# Patient Record
Sex: FEMALE | Race: AMERICAN INDIAN OR ALASKA NATIVE
[De-identification: names, ages, dates, MRNs, and addresses within clinical notes are randomized per-mention and may not be internally consistent; named-entity substitution may affect disease eponyms.]

---

## 2017-10-10 NOTE — MAMMOGRAPHY REPORT
BONE DENSITY STUDY:



DEFINITIONS:

  BMD     = Bone Mineral Density

  T-score = BMD related to mean peak bone mass of young adult

            (mean expressed in Standard Deviation)

  Z-score = Age matched BMD expressed in SD



World Health Organization (WHO) Diagnostic Criteria

  Normal             T-score > -1 SD

  Osteopenia      T-score between -1 and -2.4 SD

  Osteoporosis    T-score -2.5 SD or below



FINDINGS:

The weighted average BMD of lumbar spine L1-L4 is 1.027 with a T-score 

of -2.2.



The weighted average BMD of hip is 0.986 with a T-score of 0.4.



IMPRESSION:

The patient's T-score is diagnostic for normal bone density and

low relative risk for fracture.



NOTE:

BMD is not the only risk factor for fracture; also consider

factors such as the patient's age, risk of falling, previous

osteoporotic fracture, family history of osteoporotic fractures, 

current smoker, and low body weight.



Jeffries's triangle is a region of interest in femur, predominantly

of trabecular bone.  It is not a true anatomic site, and [facility] does not 

recommend its use clinically.

## 2019-11-30 ENCOUNTER — HOSPITAL ENCOUNTER (INPATIENT)
Dept: HOSPITAL 5 - ED | Age: 59
LOS: 2 days | Discharge: HOME | DRG: 206 | End: 2019-12-02
Attending: INTERNAL MEDICINE | Admitting: INTERNAL MEDICINE
Payer: MEDICAID

## 2019-11-30 DIAGNOSIS — M94.0: Primary | ICD-10-CM

## 2019-11-30 DIAGNOSIS — F17.210: ICD-10-CM

## 2019-11-30 DIAGNOSIS — M10.9: ICD-10-CM

## 2019-11-30 DIAGNOSIS — Z95.5: ICD-10-CM

## 2019-11-30 DIAGNOSIS — Z71.6: ICD-10-CM

## 2019-11-30 DIAGNOSIS — Z86.73: ICD-10-CM

## 2019-11-30 DIAGNOSIS — I48.91: ICD-10-CM

## 2019-11-30 DIAGNOSIS — Z90.710: ICD-10-CM

## 2019-11-30 DIAGNOSIS — Z79.899: ICD-10-CM

## 2019-11-30 DIAGNOSIS — I47.1: ICD-10-CM

## 2019-11-30 DIAGNOSIS — I25.10: ICD-10-CM

## 2019-11-30 DIAGNOSIS — I10: ICD-10-CM

## 2019-11-30 DIAGNOSIS — I48.92: ICD-10-CM

## 2019-11-30 DIAGNOSIS — Z72.89: ICD-10-CM

## 2019-11-30 DIAGNOSIS — Z82.49: ICD-10-CM

## 2019-11-30 DIAGNOSIS — F32.9: ICD-10-CM

## 2019-11-30 LAB
APTT BLD: 26.4 SEC. (ref 24.2–36.6)
BASOPHILS # (AUTO): 0 K/MM3 (ref 0–0.1)
BASOPHILS NFR BLD AUTO: 0.7 % (ref 0–1.8)
BUN SERPL-MCNC: 15 MG/DL (ref 7–17)
BUN/CREAT SERPL: 15 %
CALCIUM SERPL-MCNC: 8.9 MG/DL (ref 8.4–10.2)
EOSINOPHIL # BLD AUTO: 0 K/MM3 (ref 0–0.4)
EOSINOPHIL NFR BLD AUTO: 0.8 % (ref 0–4.3)
HCT VFR BLD CALC: 34 % (ref 30.3–42.9)
HEMOLYSIS INDEX: 17
HGB BLD-MCNC: 11.4 GM/DL (ref 10.1–14.3)
INR PPP: 1 (ref 0.87–1.13)
LYMPHOCYTES # BLD AUTO: 1.4 K/MM3 (ref 1.2–5.4)
LYMPHOCYTES NFR BLD AUTO: 25.9 % (ref 13.4–35)
MCHC RBC AUTO-ENTMCNC: 34 % (ref 30–34)
MCV RBC AUTO: 110 FL (ref 79–97)
MONOCYTES # (AUTO): 0.4 K/MM3 (ref 0–0.8)
MONOCYTES % (AUTO): 7.2 % (ref 0–7.3)
PLATELET # BLD: 206 K/MM3 (ref 140–440)
RBC # BLD AUTO: 3.09 M/MM3 (ref 3.65–5.03)

## 2019-11-30 PROCEDURE — 85610 PROTHROMBIN TIME: CPT

## 2019-11-30 PROCEDURE — G0378 HOSPITAL OBSERVATION PER HR: HCPCS

## 2019-11-30 PROCEDURE — 85025 COMPLETE CBC W/AUTO DIFF WBC: CPT

## 2019-11-30 PROCEDURE — 99406 BEHAV CHNG SMOKING 3-10 MIN: CPT

## 2019-11-30 PROCEDURE — A9502 TC99M TETROFOSMIN: HCPCS

## 2019-11-30 PROCEDURE — 71045 X-RAY EXAM CHEST 1 VIEW: CPT

## 2019-11-30 PROCEDURE — 84484 ASSAY OF TROPONIN QUANT: CPT

## 2019-11-30 PROCEDURE — 36415 COLL VENOUS BLD VENIPUNCTURE: CPT

## 2019-11-30 PROCEDURE — 80061 LIPID PANEL: CPT

## 2019-11-30 PROCEDURE — 93010 ELECTROCARDIOGRAM REPORT: CPT

## 2019-11-30 PROCEDURE — 80048 BASIC METABOLIC PNL TOTAL CA: CPT

## 2019-11-30 PROCEDURE — 96374 THER/PROPH/DIAG INJ IV PUSH: CPT

## 2019-11-30 PROCEDURE — 85730 THROMBOPLASTIN TIME PARTIAL: CPT

## 2019-11-30 PROCEDURE — 93005 ELECTROCARDIOGRAM TRACING: CPT

## 2019-11-30 PROCEDURE — 87116 MYCOBACTERIA CULTURE: CPT

## 2019-11-30 PROCEDURE — 93017 CV STRESS TEST TRACING ONLY: CPT

## 2019-11-30 PROCEDURE — 78452 HT MUSCLE IMAGE SPECT MULT: CPT

## 2019-11-30 NOTE — EMERGENCY DEPARTMENT REPORT
ED Chest Pain HPI





- General


Chief Complaint: Arrhythmia/Palpitations


Stated Complaint: CHEST PAIN


Time Seen by Provider: 11/30/19 19:56


Source: patient, EMS


Mode of arrival: Stretcher


Limitations: No Limitations





- History of Present Illness


Initial Comments: 





58-year-old female with history of A. fib, CVA, presents to ED with chest pain, 

onset this evening.  EMS was called, by EMS arrival patient found to be in SVT. 

Adenosine 6 mg given, however, heart rate did improve the patient transported to

the ED.  Patient reports "heaviness" in her chest.  She denies any shortness of 

breath.  Vagal maneuvers were attempted but did not work.  Patient denies 

palpitations.  Pt reports recent cardioversion.


MD Complaint: chest pain


-: This evening


Onset: during rest


Pain Location: substernal


Pain Radiation: none


Severity: moderate


Quality: heaviness


Consistency: constant


Improves With: nothing


Worsens With: nothing


re: denies: nausea, vomting, dyspnea


Other Symptoms: denies: palpitations


Treatments Prior to Arrival: other (adenosine)





- Related Data


                                Home Medications











 Medication  Instructions  Recorded  Confirmed  Last Taken


 


Amlodipine Besylate/Benazepril 1 each PO QDAY 09/11/14 11/30/19 10/31/14





[Lotrel 5-10 mg]    


 


buPROPion [Wellbutrin] 75 mg PO BID 09/11/14 11/30/19 10/31/14


 


cloNIDine [Catapres] 0.2 mg PO BID 09/11/14 11/30/19 10/31/14


 


Apixaban [Eliquis] 5 mg PO BID 11/30/19 11/30/19 Unknown


 


ISOSORBIDE MONOnitrate [Imdur ER] 30 mg PO DAILY 11/30/19 11/30/19 Unknown


 


carvediloL [Coreg] 25 mg PO BID 11/30/19 11/30/19 Unknown











                                    Allergies











Allergy/AdvReac Type Severity Reaction Status Date / Time


 


No Known Allergies Allergy   Verified 01/13/15 15:11














Heart Score





- HEART Score


History: Moderately suspicious


EKG: Non-specific


Age: 45-65


Risk factors: > 3 risk factors or hx of atherosclerotic disease


Troponin: < normal limit


HEART Score: 5





ED Review of Systems


ROS: 


Stated complaint: CHEST PAIN


Other details as noted in HPI





Comment: All other systems reviewed and negative


Constitutional: denies: chills, fever


Respiratory: denies: shortness of breath


Cardiovascular: chest pain.  denies: palpitations


Gastrointestinal: denies: nausea, vomiting





ED Past Medical Hx





- Past Medical History


Hx Hypertension: Yes


Hx CVA: Yes


Hx Congestive Heart Failure: No


Hx Diabetes: No


Hx Renal Disease: Yes


Hx Seizures: Yes


Hx Asthma: No


Hx COPD: No


Hx HIV: No


Additional medical history: heart murmur, GOUT, a-fib (w/ RVR), SVT, 

cardioversion





- Surgical History


Additional Surgical History: hysterectomy, cone biopsy from cervix





- Social History


Smoking Status: Current Every Day Smoker


Substance Use Type: Alcohol





- Medications


Home Medications: 


                                Home Medications











 Medication  Instructions  Recorded  Confirmed  Last Taken  Type


 


Amlodipine Besylate/Benazepril 1 each PO QDAY 09/11/14 11/30/19 10/31/14 History





[Lotrel 5-10 mg]     


 


buPROPion [Wellbutrin] 75 mg PO BID 09/11/14 11/30/19 10/31/14 History


 


cloNIDine [Catapres] 0.2 mg PO BID 09/11/14 11/30/19 10/31/14 History


 


Apixaban [Eliquis] 5 mg PO BID 11/30/19 11/30/19 Unknown History


 


ISOSORBIDE MONOnitrate [Imdur ER] 30 mg PO DAILY 11/30/19 11/30/19 Unknown 

History


 


carvediloL [Coreg] 25 mg PO BID 11/30/19 11/30/19 Unknown History














ED Physical Exam





- General


Limitations: No Limitations


General appearance: alert, in no apparent distress





- Head


Head exam: Present: atraumatic, normocephalic





- Eye


Eye exam: Present: normal appearance





- ENT


ENT exam: Present: mucous membranes moist





- Neck


Neck exam: Present: normal inspection





- Respiratory


Respiratory exam: Present: normal lung sounds bilaterally.  Absent: respiratory 

distress





- Cardiovascular


Cardiovascular Exam: Present: normal rhythm, tachycardia





- GI/Abdominal


GI/Abdominal exam: Present: soft.  Absent: distended, tenderness





- Extremities Exam


Extremities exam: Present: normal inspection





- Neurological Exam


Neurological exam: Present: alert, oriented X3, motor sensory deficit (baseline 

left-sided weakness secondary to previous CVA)





- Psychiatric


Psychiatric exam: Present: normal affect, normal mood





- Skin


Skin exam: Present: warm, dry, intact, normal color.  Absent: diaphoretic





ED Course


                                   Vital Signs











  11/30/19 11/30/19 11/30/19





  19:53 19:55 19:56


 


Temperature  98.2 F 


 


Pulse Rate   174 H


 


Respiratory 14  13





Rate   


 


Blood Pressure   


 


O2 Sat by Pulse   97





Oximetry   














  11/30/19 11/30/19 11/30/19





  20:00 20:06 20:10


 


Temperature   


 


Pulse Rate 174 H 173 H 174 H


 


Respiratory 17 14 21





Rate   


 


Blood Pressure   


 


O2 Sat by Pulse 100 100 99





Oximetry   














  11/30/19 11/30/19 11/30/19





  20:16 20:20 20:25


 


Temperature   


 


Pulse Rate 160 H 175 H 173 H


 


Respiratory 19 21 18





Rate   


 


Blood Pressure 81/58 81/58 105/77


 


O2 Sat by Pulse 98 98 





Oximetry   














  11/30/19 11/30/19 11/30/19





  20:30 20:35 20:40


 


Temperature   


 


Pulse Rate 85 87 88


 


Respiratory 18 17 16





Rate   


 


Blood Pressure 133/80 129/80 133/83


 


O2 Sat by Pulse 93 100 100





Oximetry   














  11/30/19 11/30/19 11/30/19





  20:45 20:50 20:56


 


Temperature   


 


Pulse Rate 85 87 87


 


Respiratory 15 17 16





Rate   


 


Blood Pressure 131/82 126/87 50/24


 


O2 Sat by Pulse 95 99 99





Oximetry   














  11/30/19 11/30/19 11/30/19





  21:00 21:05 21:10


 


Temperature   


 


Pulse Rate 86 88 86


 


Respiratory 11 L 15 17





Rate   


 


Blood Pressure 50/24 138/88 137/91


 


O2 Sat by Pulse 98 99 99





Oximetry   














- Consultations


Consultation #1: 





11/30/19 21:40


Spoke w/ Dr Scales. Pt to be seen in AM.





ED Medical Decision Making





- Lab Data


Result diagrams: 


                                 11/30/19 20:49





                                 11/30/19 20:49





- EKG Data


-: EKG Interpreted by Me


EKG shows normal: QRS complexes


Rate: tachycardia (rate 168)





- EKG Data


Interpretation: other (SVT)





- Radiology Data


Radiology results: report reviewed, image reviewed





- Medical Decision Making





57 yo F w/ chest pain and atrial flutter with RVR.  Initially suspected to be 

SVT, pt was given adenosine 6 mg in the field with no response, and 12 mg here 

in the ED w/ short period of slowing of heart rate. Rate then returned to the 

170s.  Diltiazem was given and rate responded by decreasing to the 80s.  BP also

 improved.  HR increased again to the 120s and cardizem drip was initiated.  

Labs are unremarkable.  Will admit to hospitalist,  Dr Stuart.





- Differential Diagnosis


SVT, Afib/ Aflutter, ACS


Critical Care Time: Yes


Critical care time in (mins) excluding proc time.: 35


Critical care attestation.: 


If time is entered above; I have spent that time in minutes in the direct care 

of this critically ill patient, excluding procedure time.





Critical Care Time: 





35 minutes





ED Disposition


Clinical Impression: 


 Atrial flutter with rapid ventricular response, Acute chest pain





Disposition: DC-09 OP ADMIT IP TO THIS HOSP


Is pt being admited?: Yes


Condition: Stable


Instructions:  Chest Pain (ED)


Referrals: 


PRIMARY CARE,MD [Referring] - 3-5 Days


Time of Disposition: 21:41

## 2019-11-30 NOTE — XRAY REPORT
CHEST 1 VIEW 



INDICATION / CLINICAL INFORMATION:

chest pain.



COMPARISON: 

11/1/2014



FINDINGS:



SUPPORT DEVICES: None.

HEART / MEDIASTINUM: There is prominence the cardiac silhouette. There is mild hilar prominence which
 appears similar to the previous study from 2014. This is likely vascular prominence. 

LUNGS / PLEURA: No significant pulmonary or pleural abnormality..  No pneumothorax. 



ADDITIONAL FINDINGS: No significant additional findings.



IMPRESSION:

1. No significant change



Signer Name: Heri Armstrong MD 

Signed: 11/30/2019 9:26 PM

 Workstation Name: [id number]

## 2019-12-01 LAB — HDLC SERPL-MCNC: 51 MG/DL (ref 40–59)

## 2019-12-01 RX ADMIN — NICOTINE SCH MG: 14 PATCH TRANSDERMAL at 09:31

## 2019-12-01 RX ADMIN — APIXABAN SCH MG: 5 TABLET, FILM COATED ORAL at 09:35

## 2019-12-01 RX ADMIN — APIXABAN SCH MG: 5 TABLET, FILM COATED ORAL at 22:03

## 2019-12-01 RX ADMIN — LISINOPRIL SCH MG: 10 TABLET ORAL at 09:35

## 2019-12-01 NOTE — HISTORY AND PHYSICAL REPORT
History of Present Illness


Date of examination: 11/30/19


Date of admission: 


11/30/19 23:56





Chief complaint: 


Chest pain and palpitations for the last 4 hours





History of present illness: 


58-year-old female with history of CVA and A. fib presents with chest pain since

evening.  When the EMS arrived patient was found to be in supraventricular 

tachycardia.  Patient also complains of chest tightness which is retrosternal 

and nonradiating.  No palpitations or shortness of breath and no diaphoresis.  

No exacerbating or precipitating factors.  Patient has history of hypertension 

and coronary artery disease.  Vagal maneuvers were attempted but Did not bring 

down the heart rate.








Past Medical History


Hypertension: Yes


CVA: Yes


Renal Disease: Yes


Seizures: Yes


Additional medical history: heart murmur, GOUT, a-fib (w/ RVR), SVT, 

cardioversion





Surgical History


Additional Surgical History: hysterectomy, cone biopsy from cervix





Social History 


Smoking Status: Current Every Day Smoker


Substance Use Type: Alcohol 





Family history


Htn





-Medications


Home Medications: 


                                Home Medications











 Medication  Instructions  Recorded  Confirmed  Last Taken  Type


 


Amlodipine Besylate/Benazepril 1 each PO QDAY 09/11/14 11/30/19 10/31/14 History





[Lotrel 5-10 mg]     


 


buPROPion [Wellbutrin] 75 mg PO BID 09/11/14 11/30/19 10/31/14 History


 


cloNIDine [Catapres] 0.2 mg PO BID 09/11/14 11/30/19 10/31/14 History


 


Apixaban [Eliquis] 5 mg PO BID 11/30/19 11/30/19 Unknown History


 


ISOSORBIDE MONOnitrate [Imdur ER] 30 mg PO DAILY 11/30/19 11/30/19 Unknown 

History


 


carvediloL [Coreg] 25 mg PO BID 11/30/19 11/30/19 Unknown History














Review of Systems


ROS: 


Stated complaint: CHEST PAIN


Other details as noted in HPI





Comment: All other systems reviewed and negative


Constitutional: denies: chills, fever


Respiratory: denies: shortness of breath


Cardiovascular: chest pain.  denies: palpitations


Gastrointestinal: denies: nausea, vomiting











Medications and Allergies


                                    Allergies











Allergy/AdvReac Type Severity Reaction Status Date / Time


 


No Known Allergies Allergy   Verified 01/13/15 15:11











                                Home Medications











 Medication  Instructions  Recorded  Confirmed  Last Taken  Type


 


Amlodipine Besylate/Benazepril 1 each PO QDAY 09/11/14 11/30/19 10/31/14 History





[Lotrel 5-10 mg]     


 


buPROPion [Wellbutrin] 75 mg PO BID 09/11/14 11/30/19 10/31/14 History


 


cloNIDine [Catapres] 0.2 mg PO BID 09/11/14 11/30/19 10/31/14 History


 


Apixaban [Eliquis] 5 mg PO BID 11/30/19 11/30/19 Unknown History


 


ISOSORBIDE MONOnitrate [Imdur ER] 30 mg PO DAILY 11/30/19 11/30/19 Unknown 

History


 


carvediloL [Coreg] 25 mg PO BID 11/30/19 11/30/19 Unknown History











Active Meds: 


Active Medications





Amlodipine Besylate (Amlodipine)  5 mg PO QDAY TRE


Apixaban (Eliquis)  5 mg PO BID TRE; Protocol


Bupropion HCl (Wellbutrin)  75 mg PO BID TRE


Carvedilol (Coreg)  25 mg PO BID TRE


Clonidine HCl (Catapres)  0.2 mg PO BID TRE


Diltiazem HCl (Cardizem/D5w 100mg/100ml)  100 mg in 100 mls @ 5 mls/hr IV TITR 

TRE


Isosorbide Mononitrate (Imdur)  30 mg PO DAILY@0800 TRE


Lisinopril (Zestril)  10 mg PO QDAY TRE











Exam





- Constitutional


Vitals: 


                                        











Temp Pulse Resp BP Pulse Ox


 


 98.1 F   83   18   152/98   100 


 


 12/01/19 03:52  12/01/19 03:52  12/01/19 04:37  12/01/19 03:52  12/01/19 03:52











General appearance: Present: no acute distress, well-nourished





- EENT


Eyes: Present: PERRL


ENT: hearing intact, clear oral mucosa





- Neck


Neck: Present: supple, normal ROM





- Respiratory


Respiratory effort: normal


Respiratory: bilateral: CTA





- Cardiovascular


Heart rate: 130


Rhythm: irregularly irregular


Heart Sounds: Present: S1 & S2.  Absent: rub, click





- Extremities


Extremities: no ischemia, pulses intact, pulses symmetrical, No edema


Peripheral Pulses: within normal limits





- Abdominal


General gastrointestinal: Present: soft, non-tender, non-distended, normal bowel

 sounds


Female genitourinary: Present: normal





- Rectal


Rectal Exam: deferred





- Integumentary


Integumentary: Present: clear, warm, dry





- Musculoskeletal


Musculoskeletal: gait normal, strength equal bilaterally





- Psychiatric


Psychiatric: appropriate mood/affect, intact judgment & insight





- Neurologic


Neurologic: CNII-XII intact, moves all extremities





- Allied Health


Allied health notes reviewed: nursing, case management





Results





- Labs


CBC & Chem 7: 


                                 11/30/19 20:49





                                 11/30/19 20:49


Labs: 


                             Laboratory Last Values











WBC  5.5 K/mm3 (4.5-11.0)   11/30/19  20:49    


 


RBC  3.09 M/mm3 (3.65-5.03)  L  11/30/19  20:49    


 


Hgb  11.4 gm/dl (10.1-14.3)   11/30/19  20:49    


 


Hct  34.0 % (30.3-42.9)   11/30/19  20:49    


 


MCV  110 fl (79-97)  H  11/30/19  20:49    


 


MCH  37 pg (28-32)  H  11/30/19  20:49    


 


MCHC  34 % (30-34)   11/30/19  20:49    


 


RDW  14.4 % (13.2-15.2)   11/30/19  20:49    


 


Plt Count  206 K/mm3 (140-440)   11/30/19  20:49    


 


Lymph % (Auto)  25.9 % (13.4-35.0)   11/30/19  20:49    


 


Mono % (Auto)  7.2 % (0.0-7.3)   11/30/19  20:49    


 


Eos % (Auto)  0.8 % (0.0-4.3)   11/30/19  20:49    


 


Baso % (Auto)  0.7 % (0.0-1.8)   11/30/19  20:49    


 


Lymph #  1.4 K/mm3 (1.2-5.4)   11/30/19  20:49    


 


Mono #  0.4 K/mm3 (0.0-0.8)   11/30/19  20:49    


 


Eos #  0.0 K/mm3 (0.0-0.4)   11/30/19  20:49    


 


Baso #  0.0 K/mm3 (0.0-0.1)   11/30/19  20:49    


 


Seg Neutrophils %  65.4 % (40.0-70.0)   11/30/19  20:49    


 


Seg Neutrophils #  3.6 K/mm3 (1.8-7.7)   11/30/19  20:49    


 


PT  13.1 Sec. (12.2-14.9)   11/30/19  20:49    


 


INR  1.00  (0.87-1.13)   11/30/19  20:49    


 


APTT  26.4 Sec. (24.2-36.6)   11/30/19  20:49    


 


Sodium  145 mmol/L (137-145)   11/30/19  20:49    


 


Potassium  4.5 mmol/L (3.6-5.0)   11/30/19  20:49    


 


Chloride  110.7 mmol/L ()  H  11/30/19  20:49    


 


Carbon Dioxide  19 mmol/L (22-30)  L  11/30/19  20:49    


 


Anion Gap  20 mmol/L  11/30/19  20:49    


 


BUN  15 mg/dL (7-17)   11/30/19  20:49    


 


Creatinine  1.0 mg/dL (0.7-1.2)   11/30/19  20:49    


 


Estimated GFR  > 60 ml/min  11/30/19  20:49    


 


BUN/Creatinine Ratio  15 %  11/30/19  20:49    


 


Glucose  86 mg/dL ()   11/30/19  20:49    


 


Calcium  8.9 mg/dL (8.4-10.2)   11/30/19  20:49    


 


Troponin T  0.037 ng/mL (0.00-0.029)  H D 11/30/19  23:20    


 


Triglycerides  181 mg/dL (2-149)  H  11/30/19  23:20    


 


Cholesterol  168 mg/dL ()   11/30/19  23:20    


 


LDL Cholesterol Direct  97 mg/dL ()   11/30/19  23:20    


 


HDL Cholesterol  51 mg/dL (40-59)   11/30/19  23:20    


 


Cholesterol/HDL Ratio  3.29 %  11/30/19  23:20    








                                    Short CBC











  11/30/19 Range/Units





  20:49 


 


WBC  5.5  (4.5-11.0)  K/mm3


 


Hgb  11.4  (10.1-14.3)  gm/dl


 


Hct  34.0  (30.3-42.9)  %


 


Plt Count  206  (140-440)  K/mm3








                                       BMP











  11/30/19





  20:49


 


Sodium  145


 


Potassium  4.5


 


Chloride  110.7 H


 


Carbon Dioxide  19 L


 


BUN  15


 


Creatinine  1.0


 


Glucose  86


 


Calcium  8.9








                                 Cardiac Enzymes











  11/30/19 11/30/19 Range/Units





  20:49 23:20 


 


Troponin T  0.024  0.037 H D  (0.00-0.029)  ng/mL














- Imaging and Cardiology


EKG: report reviewed (SVT heart rate of 168/m)


Chest x-ray: report reviewed (no acute findings)





Assessment and Plan


Advance Directives: Yes (full code)


VTE prophylaxis?: Chemical


Plan of care discussed with patient/family: Yes





- Patient Problems


(1) Supraventricular tachycardia


Current Visit: Yes   Status: Acute   


Plan to address problem: 


Patient was given Adenosine  6 mg in the field and 12 mg in the emergency room 

with no response.  Patient was given Cardizem IV 1 for which there was a 

transient improvement in the heart rate to 80s.  Patient reverted back to 

tachycardia.  Cardizem drip was initiated at 5 mg per hour to which the patient 

responded.  Continue Cardizem drip


The patient also initiated on oral Cardizem at 120 mg per day.  Cardiology 

consult requested











(2) Acute chest pain


Current Visit: Yes   Status: Acute   


Plan to address problem: 


Chest pain protocol


Serial troponins


Lexiscan


Cardiology consult requested


Second troponin is slightly high


We will initiate heparin drip if necessary








(3) Hypertension


Current Visit: No   Status: Chronic   


Qualifiers: 


   Hypertension type: essential hypertension   Qualified Code(s): I10 - 

Essential (primary) hypertension   


Plan to address problem: 


Continue antihypertensives








(4) Atrial fibrillation with RVR


Current Visit: Yes   Status: Acute   


Plan to address problem: 


Continue ELIQUIS








(5) Depression


Current Visit: Yes   Status: Chronic   


Qualifiers: 


   Depression Type: unspecified   Qualified Code(s): F32.9 - Major depressive 

disorder, single episode, unspecified   


Plan to address problem: 


Continue Wellbutrin








(6) Nicotine dependence


Current Visit: Yes   Status: Chronic   


Qualifiers: 


   Nicotine product type: cigarettes 


Plan to address problem: 


Patient counseled and NicoDerm patch initiated








(7) DVT prophylaxis


Current Visit: Yes   Status: Acute   


Plan to address problem: 


On heparin 5000 every 12

## 2019-12-01 NOTE — PROGRESS NOTE
Assessment and Plan


Assessment and plan: 


58-year-old female with history of CVA and A. fib presents with chest pain since

evening.  When the EMS arrived patient was found to be in supraventricular 

tachycardia.  Patient also complains of chest tightness which is retrosternal 

and nonradiating.  No palpitations or shortness of breath and no diaphoresis.  

No exacerbating or precipitating factors.  Patient has history of hypertension 

and coronary artery disease.  Vagal maneuvers were attempted but Did not bring 

down the heart rate.





* appears older than stated age


* PER cardiology will begin to wean Diltaizem also stop amlodopin in favor of 

  diltiazem


* 





- Patient Problems


Symptomatic atrial fibrillation


Current Visit: Yes   Status: Acute   


Plan to address problem: 


Patient was given Adenosine  6 mg in the field and 12 mg in the emergency room 

with no response.  Patient was given Cardizem IV 1 for which there was a 

transient improvement in the heart rate to 80s. 


Wean diltiazem gtt.  Cardiology input noted


The patient also initiated on oral Cardizem at 120 mg per day.


On Eliquis 





 Acute chest pain- Secondary constochondritis


Current Visit: Yes   Status: Acute   


Plan to address problem: 


Chest pain protocol


Serial troponins


Lexiscan if okay with cardiology


Cardiology consult requested


Second troponin is slightly high but third is better


Discontinue Heparin drip





 Hypertension


Current Visit: No   Status: Chronic   


Qualifiers: 


   Hypertension type: essential hypertension   Qualified Code(s): I10 - 

Essential (primary) hypertension   


Plan to address problem: 


Continue antihypertensives





 Depression


Current Visit: Yes   Status: Chronic   


Qualifiers: 


   Depression Type: unspecified   Qualified Code(s): F32.9 - Major depressive 

disorder, single episode, unspecified   


Plan to address problem: 


Continue Wellbutrin





 Nicotine dependence


Current Visit: Yes   Status: Chronic   


Qualifiers: 


   Nicotine product type: cigarettes 


Plan to address problem: 


Patient counseled and NicoDerm patch initiated





h/o CVA: Statin and EliquIs





h/o CAD stent, uncertain history





DVT prophylaxis


Current Visit: Yes   Status: Acute   


Plan to address problem: 


Eliqus














History


Interval history: 


Patient seen and examined, still reports pain, asking for morphine and reporting

other meds do not work. For now will continue with oral meds, as patient is now 

hypotensive








Hospitalist Physical





- Physical exam


Narrative exam: 


VITAL SIGNS:  Reviewed.    


GENERAL:  The patient appears normally developed, appears older than her stated 

age vital signs as documented.


HEAD:  No signs of head trauma.


EYES:  Pupils are equal.  Extraocular motions intact.  


EARS:  Hearing grossly intact.


MOUTH:  Oropharynx is normal. 


NECK:  No adenopathy, no JVD.  


CHEST:  Chest with clear breath sounds bilaterally.  No wheezes, rales, or 

rhonchi.  


CARDIAC: Irregularly irregular, rate controlled.  S1 and S2, without murmurs, 

gallops, or rubs.


VASCULAR:  No Edema.  Peripheral pulses normal and equal in all extremities.


ABDOMEN:  Soft, non tender and non distended.  No   rebound or guarding, and no 

masses palpated.   Bowel Sounds normal.


MUSCULOSKELETAL:  Good range of motion of all major joints. Extremities without 

clubbing, cyanosis or edema.  


NEUROLOGIC EXAM: Awake and oriented x3 but lethargic appearing no focal sensory 

or strength deficits.   Speech normal.  Follows commands.


PSYCHIATRIC:  Mood normal.


SKIN:  detial exam as documented in skin assessment








- Constitutional


Vitals: 


                                        











Temp Pulse Resp BP Pulse Ox


 


 98.2 F   84   18   93/56   100 


 


 12/01/19 11:49  12/01/19 11:49  12/01/19 11:49  12/01/19 11:49  12/01/19 11:49











General appearance: Present: no acute distress





Results





- Labs


CBC & Chem 7: 


                                 11/30/19 20:49





                                 11/30/19 20:49


Labs: 


                             Laboratory Last Values











WBC  5.5 K/mm3 (4.5-11.0)   11/30/19  20:49    


 


RBC  3.09 M/mm3 (3.65-5.03)  L  11/30/19  20:49    


 


Hgb  11.4 gm/dl (10.1-14.3)   11/30/19  20:49    


 


Hct  34.0 % (30.3-42.9)   11/30/19  20:49    


 


MCV  110 fl (79-97)  H  11/30/19  20:49    


 


MCH  37 pg (28-32)  H  11/30/19  20:49    


 


MCHC  34 % (30-34)   11/30/19  20:49    


 


RDW  14.4 % (13.2-15.2)   11/30/19  20:49    


 


Plt Count  206 K/mm3 (140-440)   11/30/19  20:49    


 


Lymph % (Auto)  25.9 % (13.4-35.0)   11/30/19  20:49    


 


Mono % (Auto)  7.2 % (0.0-7.3)   11/30/19  20:49    


 


Eos % (Auto)  0.8 % (0.0-4.3)   11/30/19  20:49    


 


Baso % (Auto)  0.7 % (0.0-1.8)   11/30/19  20:49    


 


Lymph #  1.4 K/mm3 (1.2-5.4)   11/30/19  20:49    


 


Mono #  0.4 K/mm3 (0.0-0.8)   11/30/19  20:49    


 


Eos #  0.0 K/mm3 (0.0-0.4)   11/30/19  20:49    


 


Baso #  0.0 K/mm3 (0.0-0.1)   11/30/19  20:49    


 


Seg Neutrophils %  65.4 % (40.0-70.0)   11/30/19  20:49    


 


Seg Neutrophils #  3.6 K/mm3 (1.8-7.7)   11/30/19  20:49    


 


PT  13.1 Sec. (12.2-14.9)   11/30/19  20:49    


 


INR  1.00  (0.87-1.13)   11/30/19  20:49    


 


APTT  26.4 Sec. (24.2-36.6)   11/30/19  20:49    


 


Sodium  145 mmol/L (137-145)   11/30/19  20:49    


 


Potassium  4.5 mmol/L (3.6-5.0)   11/30/19  20:49    


 


Chloride  110.7 mmol/L ()  H  11/30/19  20:49    


 


Carbon Dioxide  19 mmol/L (22-30)  L  11/30/19  20:49    


 


Anion Gap  20 mmol/L  11/30/19  20:49    


 


BUN  15 mg/dL (7-17)   11/30/19  20:49    


 


Creatinine  1.0 mg/dL (0.7-1.2)   11/30/19  20:49    


 


Estimated GFR  > 60 ml/min  11/30/19  20:49    


 


BUN/Creatinine Ratio  15 %  11/30/19  20:49    


 


Glucose  86 mg/dL ()   11/30/19  20:49    


 


Calcium  8.9 mg/dL (8.4-10.2)   11/30/19  20:49    


 


Troponin T  0.023 ng/mL (0.00-0.029)   12/01/19  13:47    


 


Triglycerides  181 mg/dL (2-149)  H  11/30/19  23:20    


 


Cholesterol  168 mg/dL ()   11/30/19  23:20    


 


LDL Cholesterol Direct  97 mg/dL ()   11/30/19  23:20    


 


HDL Cholesterol  51 mg/dL (40-59)   11/30/19  23:20    


 


Cholesterol/HDL Ratio  3.29 %  11/30/19  23:20    














Active Medications





- Current Medications


Current Medications: 














Generic Name Dose Route Start Last Admin





  Trade Name Freq  PRN Reason Stop Dose Admin


 


Apixaban  5 mg  12/01/19 10:00  12/01/19 09:35





  Eliquis  PO   5 mg





  BID TRE   Administration





  Protocol  


 


Bupropion HCl  75 mg  12/01/19 10:00 





  Wellbutrin  PO  





  BID TRE  


 


Carvedilol  25 mg  12/01/19 10:00  12/01/19 09:34





  Coreg  PO   25 mg





  BID TRE   Administration


 


Clonidine HCl  0.2 mg  12/01/19 10:00  12/01/19 09:35





  Catapres  PO   0.2 mg





  BID TRE   Administration


 


Diltiazem HCl  120 mg  12/01/19 10:00  12/01/19 09:34





  Cardizem Cd  PO   120 mg





  QDAY TRE   Administration


 


Heparin Sodium (Porcine)  5,000 unit  12/01/19 10:00  12/01/19 09:37





  Heparin  SUB-Q   5,000 unit





  Q12HR TRE   Administration


 


Isosorbide Mononitrate  30 mg  12/01/19 08:00  12/01/19 07:59





  Imdur  PO   30 mg





  DAILY@0800 TRE   Administration


 


Lisinopril  10 mg  12/01/19 10:00  12/01/19 09:35





  Zestril  PO   10 mg





  QDAY TRE   Administration


 


Nicotine  14 mg  12/01/19 10:00  12/01/19 09:31





  Habitrol  TD   14 mg





  QDAY TRE   Administration


 


Tramadol HCl  50 mg  12/01/19 09:06  12/01/19 09:35





  Ultram  PO   50 mg





  Q6H PRN   Administration





  Pain, Moderate (4-6)  














Nutrition/Malnutrition Assess





- Dietary Evaluation


Nutrition/Malnutrition Findings: 


                                        





Nutrition Notes                                            Start:  12/01/19 

12:36


Freq:                                                      Status: Active       




Protocol:                                                                       




 Document     12/01/19 12:37  LM  (Rec: 12/01/19 12:54  LM  [id number])


 Nutrition Notes


     Need for Assessment generated from:         RN Referral,MST


     Initial or Follow up                        Assessment


     Current Diagnosis                           Hypertension,Stroke


     Other Pertinent Diagnosis                   Gout, nicotine dependence


     Current Diet                                Cardiac/consistent CHO


     Labs/Tests                                  Reviewed


     Pertinent Medications                       Reviewed


     Height                                      5 ft 4 in


     Weight                                      66.3 kg


     Usual Body Weight                           70.45 kg


     Ideal Body Weight (kg)                      54.54


     BMI                                         25.0


     Intake Prior to Admission                   Good


     Weight change and time frame                19.3% in 3 months


     Weight Status                               Overweight


     Subjective/Other Information                RN screen for MST. Pt stated


                                                 that she eats well at home but


                                                 has lost 35 lb since 3 months


                                                 ago due to overactive thyroid


                                                 . Pt reported UBW of 155 lb (


                                                 70.45kg). No physical signs of


                                                 malnutrition. No diet ordered


                                                 at time of visit.


     Burn                                        Absent


     Trauma                                      Absent


     GI Symptoms                                 None


     Current % PO                                Negligible


     Minimum of two criteria                     No physical signs of


                                                 malnutrition


     Interpretation of Weight Loss (severe)      >7.5% in 3 months


     #1


      Nutrition Diagnosis                        Predicted suboptimal energy


                                                 intake


      Etiology                                   chronic illness, thyroid


      As Evidenced by Signs and Symptoms         pt statement of 35 lb wt loss


                                                 (19.3%) in 3 months


     Is patient on ventilator?                   No


     Is Patient Ambulatory and/or Out of Bed     No


     REE-([facility]-confined to bed)      [phone]


     Calculation Used for Recommendations        [facility]


     Additional Notes                            Protein: 53-66g (0.8-1g/kg)


                                                 Fluid: 1 ml/kcal


 Nutrition Intervention


     Change Diet Order:                          Cardiac diet


     Goal #1                                     Meet at least 75% of energy


                                                 and protein needs


     Anticipated Discharge Needs:                Cardiac diet


     Follow-Up By:                               12/03/19


     Additional Comments                         F/U for PO intakes, ONS needs

## 2019-12-01 NOTE — CONSULTATION
History of Present Illness


Consult date: 12/01/19


Consult reason: atrial fibrillation


History of present illness: 





Impression





Poor historian


Symptomatic Afib/aflutter now rate controlled


h/o CVA


h/o CAD stent, uncertain history


HTN





Plan


wean diltiazem gtt


stop oral meds and titrate, HR controlled this AM.


stop amlodipine in favor of diltiazem


she has poor exercise tolerance


cont eliquis for anticoagulation


consider ischemic w/u once HR controlled oral meds





Past History


Past Medical History: atrial fib, CAD, COPD


Social history: , smoking





Medications and Allergies


                                    Allergies











Allergy/AdvReac Type Severity Reaction Status Date / Time


 


No Known Allergies Allergy   Verified 01/13/15 15:11











                                Home Medications











 Medication  Instructions  Recorded  Confirmed  Last Taken  Type


 


Amlodipine Besylate/Benazepril 1 each PO QDAY 09/11/14 11/30/19 10/31/14 History





[Lotrel 5-10 mg]     


 


buPROPion [Wellbutrin] 75 mg PO BID 09/11/14 11/30/19 10/31/14 History


 


cloNIDine [Catapres] 0.2 mg PO BID 09/11/14 11/30/19 10/31/14 History


 


Apixaban [Eliquis] 5 mg PO BID 11/30/19 11/30/19 Unknown History


 


ISOSORBIDE MONOnitrate [Imdur ER] 30 mg PO DAILY 11/30/19 11/30/19 Unknown 

History


 


carvediloL [Coreg] 25 mg PO BID 11/30/19 11/30/19 Unknown History











Active Meds: 


Active Medications





Amlodipine Besylate (Amlodipine)  5 mg PO QDAY [facility]


   Last Admin: 12/01/19 09:33 Dose:  5 mg


   Documented by: 


Apixaban (Eliquis)  5 mg PO BID [facility]; Protocol


   Last Admin: 12/01/19 09:35 Dose:  5 mg


   Documented by: 


Bupropion HCl (Wellbutrin)  75 mg PO BID [facility]


Carvedilol (Coreg)  25 mg PO BID [facility]


   Last Admin: 12/01/19 09:34 Dose:  25 mg


   Documented by: 


Clonidine HCl (Catapres)  0.2 mg PO BID [facility]


   Last Admin: 12/01/19 09:35 Dose:  0.2 mg


   Documented by: 


Diltiazem HCl (Cardizem Cd)  120 mg PO QDAY [facility]


   Last Admin: 12/01/19 09:34 Dose:  120 mg


   Documented by: 


Heparin Sodium (Porcine) (Heparin)  5,000 unit SUB-Q Q12HR [facility]


   Last Admin: 12/01/19 09:37 Dose:  5,000 unit


   Documented by: 


Isosorbide Mononitrate (Imdur)  30 mg PO DAILY@0800 [facility]


   Last Admin: 12/01/19 07:59 Dose:  30 mg


   Documented by: 


Lisinopril (Zestril)  10 mg PO QDAY [facility]


   Last Admin: 12/01/19 09:35 Dose:  10 mg


   Documented by: 


Nicotine (Habitrol)  14 mg TD QDAY [facility]


   Last Admin: 12/01/19 09:31 Dose:  14 mg


   Documented by: 


Tramadol HCl (Ultram)  50 mg PO Q6H PRN


   PRN Reason: Pain, Moderate (4-6)


   Last Admin: 12/01/19 09:35 Dose:  50 mg


   Documented by: 











Physical Examination


                                   Vital Signs











Resp


 


 14 


 


 11/30/19 19:53











General appearance: no acute distress


HEENT: Positive: PERRL


Neck: Positive: neck supple


Cardiac: Positive: irregularly irregular, S1/S2


Lungs: Positive: Normal Exam


Abdomen: Positive: Soft.  Negative: Pulsations/Bruits





Results





                                 11/30/19 20:49





                                 11/30/19 20:49


                                   Coagulation











  11/30/19 Range/Units





  20:49 


 


PT  13.1  (12.2-14.9)  Sec.


 


INR  1.00  (0.87-1.13)  


 


APTT  26.4  (24.2-36.6)  Sec.








                                     Lipids











  11/30/19 Range/Units





  23:20 


 


Triglycerides  181 H  (2-149)  mg/dL


 


Cholesterol  168  ()  mg/dL


 


HDL Cholesterol  51  (40-59)  mg/dL


 


Cholesterol/HDL Ratio  3.29  %








                                       CBC











  11/30/19 Range/Units





  20:49 


 


WBC  5.5  (4.5-11.0)  K/mm3


 


RBC  3.09 L  (3.65-5.03)  M/mm3


 


Hgb  11.4  (10.1-14.3)  gm/dl


 


Hct  34.0  (30.3-42.9)  %


 


Plt Count  206  (140-440)  K/mm3


 


Lymph #  1.4  (1.2-5.4)  K/mm3


 


Mono #  0.4  (0.0-0.8)  K/mm3


 


Eos #  0.0  (0.0-0.4)  K/mm3


 


Baso #  0.0  (0.0-0.1)  K/mm3








                          Comprehensive Metabolic Panel











  11/30/19 Range/Units





  20:49 


 


Sodium  145  (137-145)  mmol/L


 


Potassium  4.5  (3.6-5.0)  mmol/L


 


Chloride  110.7 H  ()  mmol/L


 


Carbon Dioxide  19 L  (22-30)  mmol/L


 


BUN  15  (7-17)  mg/dL


 


Creatinine  1.0  (0.7-1.2)  mg/dL


 


Glucose  86  ()  mg/dL


 


Calcium  8.9  (8.4-10.2)  mg/dL

## 2019-12-02 VITALS — SYSTOLIC BLOOD PRESSURE: 135 MMHG | DIASTOLIC BLOOD PRESSURE: 83 MMHG

## 2019-12-02 RX ADMIN — LISINOPRIL SCH MG: 10 TABLET ORAL at 11:14

## 2019-12-02 RX ADMIN — NICOTINE SCH MG: 14 PATCH TRANSDERMAL at 11:13

## 2019-12-02 RX ADMIN — APIXABAN SCH MG: 5 TABLET, FILM COATED ORAL at 11:14

## 2019-12-02 NOTE — DISCHARGE SUMMARY
Providers





- Providers


Date of Admission: 


12/02/19 10:47





Attending physician: 


ERIK JIMENEZ MD





                                        





11/30/19 21:40


Consult to Physician [CONS] Stat 


   Comment: 


   Consulting Provider: CHRISTIANO TEJADA


   Physician Instructions: 


   Reason For Exam: afib w/ rvr











Primary care physician: 


CARA MITCHELL








Hospitalization


Reason for admission: Shortness of breath


Condition: Stable


Hospital course: 


58-year-old female with history of CVA and A. fib presents with chest pain since

 evening.  When the EMS arrived patient was found to be in supraventricular tach

ycardia.  Patient also complains of chest tightness which is retrosternal and 

nonradiating.  No palpitations or shortness of breath and no diaphoresis.  No 

exacerbating or precipitating factors.  Patient has history of hypertension and 

coronary artery disease.  Vagal maneuvers were attempted but Did not bring down 

the heart rate.





* appears older than stated age


* PER cardiology will begin to wean Diltaizem also stop amlodopin in favor of 

  diltiazem


* Cardiology recommendations were made patient was stable to be discharged and 

  will be following up with cardiology outpatient.  Lightly elevated troponin 

  was felt to be secondary to troponin leak but no acute cardiac events noted.  

  Findings were discussed with the patient and is currently stable for discharge





- Patient Problems


Symptomatic atrial fibrillation


Current Visit: Yes   Status: Acute   


Plan to address problem: 


Patient was given Adenosine  6 mg in the field and 12 mg in the emergency room 

with no response.  Patient was given Cardizem IV 1 for which there was a 

transient improvement in the heart rate to 80s. 


Wean diltiazem gtt.  Cardiology input noted


The patient also initiated on oral Cardizem at 120 mg per day.


On Eliquis 





 Acute chest pain- Secondary constochondritis


Current Visit: Yes   Status: Acute   


Plan to address problem: 


Chest pain protocol


Serial troponins


Lexiscan if okay with cardiology


Cardiology consult requested


Second troponin is slightly high but third is better


Discontinue Heparin drip





 Hypertension


Current Visit: No   Status: Chronic   


Qualifiers: 


   Hypertension type: essential hypertension   Qualified Code(s): I10 - 

Essential (primary) hypertension   


Plan to address problem: 


Continue antihypertensives





 Depression


Current Visit: Yes   Status: Chronic   


Qualifiers: 


   Depression Type: unspecified   Qualified Code(s): F32.9 - Major depressive 

disorder, single episode, unspecified   


Plan to address problem: 


Continue Wellbutrin





 Nicotine dependence


Current Visit: Yes   Status: Chronic   


Qualifiers: 


   Nicotine product type: cigarettes 


Plan to address problem: 


Patient counseled and NicoDerm patch initiated





h/o CVA: Statin and EliquIs





h/o CAD stent, uncertain history








Disposition: DC-01 TO HOME OR SELFCARE


Time spent for discharge: 35 minutes





Core Measure Documentation





- Palliative Care


Palliative Care/ Comfort Measures: Not Applicable





- Core Measures


Any of the following diagnoses?: none





Exam





- Physical Exam


Narrative exam: 


VITAL SIGNS:  Reviewed.    


GENERAL:  The patient appears normally developed, appears older than her stated 

age vital signs as documented.


HEAD:  No signs of head trauma.


EYES:  Pupils are equal.  Extraocular motions intact.  


EARS:  Hearing grossly intact.


MOUTH:  Oropharynx is normal. 


NECK:  No adenopathy, no JVD.  


CHEST:  Chest with clear breath sounds bilaterally.  No wheezes, rales, or 

rhonchi.  


CARDIAC: Irregularly irregular, rate controlled.  S1 and S2, without murmurs, 

gallops, or rubs.


VASCULAR:  No Edema.  Peripheral pulses normal and equal in all extremities.


ABDOMEN:  Soft, non tender and non distended.  No   rebound or guarding, and no 

masses palpated.   Bowel Sounds normal.


MUSCULOSKELETAL:  Good range of motion of all major joints. Extremities without 

clubbing, cyanosis or edema.  


NEUROLOGIC EXAM: Awake and oriented x3 but lethargic appearing no focal sensory 

or strength deficits.   Speech normal.  Follows commands.


PSYCHIATRIC:  Mood normal.


SKIN:  detial exam as documented in skin assessment








- Constitutional


Vitals: 


                                        











Temp Pulse Resp BP Pulse Ox


 


 98.1 F   57 L  18   135/83   92 


 


 12/02/19 08:15  12/02/19 11:14  12/02/19 08:15  12/02/19 11:20  12/02/19 08:15














Plan


Activity: advance as tolerated, fall precautions


Diet: low fat


Follow up with: 


PRIMARY CARE,MD [Referring] - 3-5 Days


Prescriptions: 


AtorvaSTATin [Lipitor] 40 mg PO QHS #30 tablet


dilTIAZem CD [Cardizem CD] 120 mg PO QDAY #30 capsule


lisinopriL [Zestril TAB] 10 mg PO QDAY #30 tablet

## 2019-12-02 NOTE — TREADMILL REPORT
THALLIUM STRESS TEST



LEFT VENTRICLE:  Left ventricular chamber size is within normal limits. 

Perfusion study demonstrates breast attenuation artifact, otherwise homogeneous

uptake of the tracer in all segments, no significant defects identified.  Gated

study is not available.



CONCLUSION:  No demonstrable ischemia on thallium perfusion imaging.  Negative

study.  If clinically indicated, recommend echocardiography for assessment of

left ventricular systolic function.





DD: 12/02/2019 14:16

DT: 12/02/2019 21:25

JOB# [id number]  [id number]

CA/NTS